# Patient Record
Sex: FEMALE | Race: WHITE | Employment: FULL TIME | URBAN - METROPOLITAN AREA
[De-identification: names, ages, dates, MRNs, and addresses within clinical notes are randomized per-mention and may not be internally consistent; named-entity substitution may affect disease eponyms.]

---

## 2024-11-12 ENCOUNTER — HOSPITAL ENCOUNTER (INPATIENT)
Facility: HOSPITAL | Age: 39
LOS: 1 days | Discharge: HOME | DRG: 378 | End: 2024-11-13
Attending: EMERGENCY MEDICINE | Admitting: FAMILY MEDICINE
Payer: COMMERCIAL

## 2024-11-12 ENCOUNTER — APPOINTMENT (OUTPATIENT)
Facility: HOSPITAL | Age: 39
DRG: 378 | End: 2024-11-12
Payer: COMMERCIAL

## 2024-11-12 DIAGNOSIS — K62.5 RECTAL BLEEDING: Primary | ICD-10-CM

## 2024-11-12 DIAGNOSIS — R18.0 MALIGNANT ASCITES: ICD-10-CM

## 2024-11-12 PROBLEM — K92.2 GIB (GASTROINTESTINAL BLEEDING): Status: ACTIVE | Noted: 2024-11-12

## 2024-11-12 LAB
ABO + RH BLD: NORMAL
ALBUMIN SERPL-MCNC: 3.2 G/DL (ref 3.5–5)
ALBUMIN/GLOB SERPL: 0.7 (ref 1.1–2.2)
ALP SERPL-CCNC: 178 U/L (ref 45–117)
ALT SERPL-CCNC: 24 U/L (ref 12–78)
ANION GAP SERPL CALC-SCNC: 4 MMOL/L (ref 2–12)
APPEARANCE UR: ABNORMAL
AST SERPL-CCNC: 13 U/L (ref 15–37)
BACTERIA URNS QL MICRO: ABNORMAL /HPF
BASOPHILS # BLD: 0 K/UL (ref 0–0.1)
BASOPHILS NFR BLD: 0 % (ref 0–1)
BILIRUB SERPL-MCNC: 0.3 MG/DL (ref 0.2–1)
BILIRUB UR QL: NEGATIVE
BLOOD GROUP ANTIBODIES SERPL: NORMAL
BUN SERPL-MCNC: 14 MG/DL (ref 6–20)
BUN/CREAT SERPL: 14 (ref 12–20)
CALCIUM SERPL-MCNC: 10.1 MG/DL (ref 8.5–10.1)
CHLORIDE SERPL-SCNC: 106 MMOL/L (ref 97–108)
CO2 SERPL-SCNC: 27 MMOL/L (ref 21–32)
COLOR UR: ABNORMAL
COMMENT:: NORMAL
CREAT SERPL-MCNC: 0.98 MG/DL (ref 0.55–1.02)
DIFFERENTIAL METHOD BLD: ABNORMAL
EOSINOPHIL # BLD: 0 K/UL (ref 0–0.4)
EOSINOPHIL NFR BLD: 0 % (ref 0–7)
EPITH CASTS URNS QL MICRO: ABNORMAL /LPF
ERYTHROCYTE [DISTWIDTH] IN BLOOD BY AUTOMATED COUNT: 14.1 % (ref 11.5–14.5)
GLOBULIN SER CALC-MCNC: 4.9 G/DL (ref 2–4)
GLUCOSE SERPL-MCNC: 92 MG/DL (ref 65–100)
GLUCOSE UR STRIP.AUTO-MCNC: NEGATIVE MG/DL
HCT VFR BLD AUTO: 32.7 % (ref 35–47)
HEMOCCULT STL QL: NEGATIVE
HGB BLD-MCNC: 10.8 G/DL (ref 11.5–16)
HGB UR QL STRIP: NEGATIVE
IMM GRANULOCYTES # BLD AUTO: 0 K/UL
IMM GRANULOCYTES NFR BLD AUTO: 0 %
KETONES UR QL STRIP.AUTO: ABNORMAL MG/DL
LEUKOCYTE ESTERASE UR QL STRIP.AUTO: ABNORMAL
LYMPHOCYTES # BLD: 0.8 K/UL (ref 0.8–3.5)
LYMPHOCYTES NFR BLD: 30 % (ref 12–49)
MCH RBC QN AUTO: 30.9 PG (ref 26–34)
MCHC RBC AUTO-ENTMCNC: 33 G/DL (ref 30–36.5)
MCV RBC AUTO: 93.7 FL (ref 80–99)
MONOCYTES # BLD: 0.1 K/UL (ref 0–1)
MONOCYTES NFR BLD: 4 % (ref 5–13)
MYELOCYTES NFR BLD MANUAL: 1 %
NEUTS BAND NFR BLD MANUAL: 4 % (ref 0–6)
NEUTS SEG # BLD: 1.8 K/UL (ref 1.8–8)
NEUTS SEG NFR BLD: 61 % (ref 32–75)
NITRITE UR QL STRIP.AUTO: NEGATIVE
NRBC # BLD: 0 K/UL (ref 0–0.01)
NRBC BLD-RTO: 0 PER 100 WBC
PH UR STRIP: 6 (ref 5–8)
PLATELET # BLD AUTO: 166 K/UL (ref 150–400)
PLATELET COMMENT: ABNORMAL
PMV BLD AUTO: 11.2 FL (ref 8.9–12.9)
POTASSIUM SERPL-SCNC: 3.8 MMOL/L (ref 3.5–5.1)
PROT SERPL-MCNC: 8.1 G/DL (ref 6.4–8.2)
PROT UR STRIP-MCNC: 100 MG/DL
RBC # BLD AUTO: 3.49 M/UL (ref 3.8–5.2)
RBC #/AREA URNS HPF: ABNORMAL /HPF (ref 0–5)
RBC MORPH BLD: ABNORMAL
SODIUM SERPL-SCNC: 137 MMOL/L (ref 136–145)
SP GR UR REFRACTOMETRY: 1.03 (ref 1–1.03)
SPECIMEN EXP DATE BLD: NORMAL
SPECIMEN HOLD: NORMAL
SPECIMEN HOLD: NORMAL
UROBILINOGEN UR QL STRIP.AUTO: 0.2 EU/DL (ref 0.2–1)
WBC # BLD AUTO: 2.8 K/UL (ref 3.6–11)
WBC URNS QL MICRO: ABNORMAL /HPF (ref 0–4)

## 2024-11-12 PROCEDURE — 6360000004 HC RX CONTRAST MEDICATION: Performed by: RADIOLOGY

## 2024-11-12 PROCEDURE — 74177 CT ABD & PELVIS W/CONTRAST: CPT

## 2024-11-12 PROCEDURE — 85025 COMPLETE CBC W/AUTO DIFF WBC: CPT

## 2024-11-12 PROCEDURE — 81001 URINALYSIS AUTO W/SCOPE: CPT

## 2024-11-12 PROCEDURE — 6370000000 HC RX 637 (ALT 250 FOR IP)

## 2024-11-12 PROCEDURE — 6370000000 HC RX 637 (ALT 250 FOR IP): Performed by: FAMILY MEDICINE

## 2024-11-12 PROCEDURE — 80053 COMPREHEN METABOLIC PANEL: CPT

## 2024-11-12 PROCEDURE — 2580000003 HC RX 258: Performed by: FAMILY MEDICINE

## 2024-11-12 PROCEDURE — 86850 RBC ANTIBODY SCREEN: CPT

## 2024-11-12 PROCEDURE — 86900 BLOOD TYPING SEROLOGIC ABO: CPT

## 2024-11-12 PROCEDURE — 1200000000 HC SEMI PRIVATE

## 2024-11-12 PROCEDURE — 99285 EMERGENCY DEPT VISIT HI MDM: CPT

## 2024-11-12 PROCEDURE — 86901 BLOOD TYPING SEROLOGIC RH(D): CPT

## 2024-11-12 PROCEDURE — 82272 OCCULT BLD FECES 1-3 TESTS: CPT

## 2024-11-12 PROCEDURE — 36415 COLL VENOUS BLD VENIPUNCTURE: CPT

## 2024-11-12 RX ORDER — BUSPIRONE HYDROCHLORIDE 10 MG/1
10 TABLET ORAL 2 TIMES DAILY
COMMUNITY

## 2024-11-12 RX ORDER — LIOTHYRONINE SODIUM 25 UG/1
25 TABLET ORAL DAILY
COMMUNITY

## 2024-11-12 RX ORDER — ARIPIPRAZOLE 2 MG/1
2 TABLET ORAL EVERY EVENING
COMMUNITY

## 2024-11-12 RX ORDER — ACETAMINOPHEN 325 MG/1
650 TABLET ORAL EVERY 6 HOURS PRN
Status: DISCONTINUED | OUTPATIENT
Start: 2024-11-12 | End: 2024-11-13 | Stop reason: HOSPADM

## 2024-11-12 RX ORDER — ONDANSETRON 4 MG/1
4 TABLET, ORALLY DISINTEGRATING ORAL EVERY 8 HOURS PRN
Status: DISCONTINUED | OUTPATIENT
Start: 2024-11-12 | End: 2024-11-13 | Stop reason: HOSPADM

## 2024-11-12 RX ORDER — VILAZODONE HYDROCHLORIDE 40 MG/1
20 TABLET ORAL DAILY
COMMUNITY

## 2024-11-12 RX ORDER — MAGNESIUM SULFATE IN WATER 40 MG/ML
2000 INJECTION, SOLUTION INTRAVENOUS PRN
Status: DISCONTINUED | OUTPATIENT
Start: 2024-11-12 | End: 2024-11-13 | Stop reason: HOSPADM

## 2024-11-12 RX ORDER — SODIUM CHLORIDE 9 MG/ML
INJECTION, SOLUTION INTRAVENOUS PRN
Status: DISCONTINUED | OUTPATIENT
Start: 2024-11-12 | End: 2024-11-13 | Stop reason: SDUPTHER

## 2024-11-12 RX ORDER — LAMOTRIGINE 25 MG/1
75 TABLET, CHEWABLE ORAL NIGHTLY
COMMUNITY

## 2024-11-12 RX ORDER — ACETAMINOPHEN 650 MG/1
650 SUPPOSITORY RECTAL EVERY 6 HOURS PRN
Status: DISCONTINUED | OUTPATIENT
Start: 2024-11-12 | End: 2024-11-13 | Stop reason: HOSPADM

## 2024-11-12 RX ORDER — POTASSIUM CHLORIDE 750 MG/1
40 TABLET, EXTENDED RELEASE ORAL PRN
Status: DISCONTINUED | OUTPATIENT
Start: 2024-11-12 | End: 2024-11-13 | Stop reason: HOSPADM

## 2024-11-12 RX ORDER — POTASSIUM CHLORIDE 7.45 MG/ML
10 INJECTION INTRAVENOUS PRN
Status: DISCONTINUED | OUTPATIENT
Start: 2024-11-12 | End: 2024-11-13 | Stop reason: HOSPADM

## 2024-11-12 RX ORDER — ONDANSETRON 2 MG/ML
4 INJECTION INTRAMUSCULAR; INTRAVENOUS EVERY 6 HOURS PRN
Status: DISCONTINUED | OUTPATIENT
Start: 2024-11-12 | End: 2024-11-13 | Stop reason: HOSPADM

## 2024-11-12 RX ORDER — IOPAMIDOL 755 MG/ML
100 INJECTION, SOLUTION INTRAVASCULAR
Status: COMPLETED | OUTPATIENT
Start: 2024-11-12 | End: 2024-11-12

## 2024-11-12 RX ORDER — SODIUM CHLORIDE 0.9 % (FLUSH) 0.9 %
5-40 SYRINGE (ML) INJECTION PRN
Status: DISCONTINUED | OUTPATIENT
Start: 2024-11-12 | End: 2024-11-13 | Stop reason: HOSPADM

## 2024-11-12 RX ORDER — LEVOTHYROXINE SODIUM 88 UG/1
88 TABLET ORAL DAILY
COMMUNITY

## 2024-11-12 RX ORDER — POLYETHYLENE GLYCOL 3350 17 G/17G
17 POWDER, FOR SOLUTION ORAL DAILY PRN
Status: DISCONTINUED | OUTPATIENT
Start: 2024-11-12 | End: 2024-11-13 | Stop reason: HOSPADM

## 2024-11-12 RX ORDER — SODIUM CHLORIDE 0.9 % (FLUSH) 0.9 %
5-40 SYRINGE (ML) INJECTION EVERY 12 HOURS SCHEDULED
Status: DISCONTINUED | OUTPATIENT
Start: 2024-11-12 | End: 2024-11-13 | Stop reason: HOSPADM

## 2024-11-12 RX ADMIN — SODIUM CHLORIDE, PRESERVATIVE FREE 10 ML: 5 INJECTION INTRAVENOUS at 20:45

## 2024-11-12 RX ADMIN — POLYETHYLENE GLYCOL-3350 AND ELECTROLYTES 4000 ML: 236; 6.74; 5.86; 2.97; 22.74 POWDER, FOR SOLUTION ORAL at 18:33

## 2024-11-12 RX ADMIN — ACETAMINOPHEN 650 MG: 325 TABLET ORAL at 17:46

## 2024-11-12 RX ADMIN — IOPAMIDOL 100 ML: 755 INJECTION, SOLUTION INTRAVENOUS at 10:29

## 2024-11-12 ASSESSMENT — ENCOUNTER SYMPTOMS
CHEST TIGHTNESS: 0
COLOR CHANGE: 0
ABDOMINAL PAIN: 0
SINUS PRESSURE: 0
SINUS PAIN: 0
SORE THROAT: 0
BLOOD IN STOOL: 1
NAUSEA: 0
EYES NEGATIVE: 1
TROUBLE SWALLOWING: 0
STRIDOR: 0
WHEEZING: 0
DIARRHEA: 0
SHORTNESS OF BREATH: 0
RHINORRHEA: 0
VOMITING: 0
COUGH: 0
BACK PAIN: 0

## 2024-11-12 ASSESSMENT — PAIN DESCRIPTION - LOCATION
LOCATION: HEAD
LOCATION: HEAD

## 2024-11-12 ASSESSMENT — PAIN DESCRIPTION - ORIENTATION
ORIENTATION: ANTERIOR
ORIENTATION: ANTERIOR

## 2024-11-12 ASSESSMENT — PAIN SCALES - GENERAL
PAINLEVEL_OUTOF10: 2
PAINLEVEL_OUTOF10: 2

## 2024-11-12 ASSESSMENT — PAIN DESCRIPTION - DESCRIPTORS
DESCRIPTORS: DISCOMFORT;DULL
DESCRIPTORS: DISCOMFORT;DULL

## 2024-11-12 ASSESSMENT — PAIN - FUNCTIONAL ASSESSMENT: PAIN_FUNCTIONAL_ASSESSMENT: NONE - DENIES PAIN

## 2024-11-12 NOTE — CONSULTS
Patient consult received.    Thank you for choosing our hematology/oncology group to be a part of your care team.  A formal consult note will follow.     Maru PERLA, NP      [facility] at [address], [address] [place] [address]  W: [phone]  F: [phone]

## 2024-11-12 NOTE — PROGRESS NOTES
1756: PT requesting to take at home medications as follows:  Synthroid- 88mcg 1x monring  Liothyonin- 25mcg 1x morning  Vilazadone- 20mg 1x morning  Buspirone- 10mg BID  Abilify- 2mg 1x Bedtime  Lamotrigine- 75mg 1x Bedtime  Clonazepam- 0.5mg- PRN  Omeprazole- 20mg 1x Nightly

## 2024-11-12 NOTE — ED PROVIDER NOTES
[facility] EMERGENCY DEP  EMERGENCY DEPARTMENT ENCOUNTER      Pt Name: Diane Griggs  MRN: [id number]  Birthdate 1985  Date of evaluation: 11/12/2024  Provider: Deep Holly MD    CHIEF COMPLAINT       Chief Complaint   Patient presents with    Rectal Bleeding         HISTORY OF PRESENT ILLNESS    This is a 39-year-old female who presents with a history of stage III ovarian cancer currently being treated with chemotherapy in [place].  She had her last dose just this past week.  She is traveling as a  helping to establish a new practice here in the [facility].  Last night she had a little bit of obstipation and took some stool softener.  This morning she was attempting to have a bowel movement.  She was not straining but noticed a moderate amount of blood that came out into the toilet and when she wiped there was some clot.  She does not think it was vaginal.  She has had a complete abdominal hysterectomy with her cancer.  She is on no blood thinner.  Patient voices no other acute complaints presently.  There is been no dizziness, shortness of breath or other acute symptomatology.            Review of External Medical Records:     Nursing Notes were reviewed.    REVIEW OF SYSTEMS       Review of Systems   Constitutional:  Negative for activity change, chills, fatigue and fever.   HENT:  Negative for congestion, ear pain, rhinorrhea, sinus pressure, sinus pain, sore throat and trouble swallowing.    Eyes: Negative.    Respiratory:  Negative for cough, chest tightness, shortness of breath, wheezing and stridor.    Cardiovascular:  Negative for chest pain, palpitations and leg swelling.   Gastrointestinal:  Positive for blood in stool. Negative for abdominal pain, diarrhea, nausea and vomiting.   Endocrine: Negative.    Genitourinary:  Negative for decreased urine volume, difficulty urinating, flank pain, frequency and hematuria.   Musculoskeletal:  Negative for back pain, joint swelling and neck  pain.   Skin:  Negative for color change, pallor and rash.   Neurological:  Negative for dizziness, syncope, speech difficulty, weakness, numbness and headaches.   Psychiatric/Behavioral:  Negative for agitation and behavioral problems.              PAST MEDICAL HISTORY   History reviewed. No pertinent past medical history.      SURGICAL HISTORY       Past Surgical History:   Procedure Laterality Date    HYSTERECTOMY, TOTAL ABDOMINAL (CERVIX REMOVED)      MASTECTOMY, BILATERAL           CURRENT MEDICATIONS       Previous Medications    ARIPIPRAZOLE (ABILIFY) 2 MG TABLET    Take 1 tablet by mouth every evening    BUSPIRONE (BUSPAR) 10 MG TABLET    Take 1 tablet by mouth in the morning and at bedtime    LAMOTRIGINE (LAMICTAL) 25 MG CHEW CHEW TAB    Take 3 tablets by mouth nightly    LEVOTHYROXINE (SYNTHROID) 88 MCG TABLET    Take 1 tablet by mouth Daily    LIOTHYRONINE (CYTOMEL) 25 MCG TABLET    Take 1 tablet by mouth daily    LORATADINE PO    Take by mouth    MULTIPLE VITAMINS-MINERALS (BARIATRIC MULTIVITAMINS/IRON PO)    Take by mouth    OMEPRAZOLE (PRILOSEC) 20 MG DELAYED RELEASE CAPSULE    Take 1 capsule by mouth Daily    VILAZODONE HCL 40 MG TABS    Take 0.5 tablets by mouth daily       ALLERGIES     Zoloft [sertraline]    FAMILY HISTORY     History reviewed. No pertinent family history.       SOCIAL HISTORY       Social History     Socioeconomic History    Marital status:      Spouse name: None    Number of children: None    Years of education: None    Highest education level: None   Tobacco Use    Smoking status: Never    Smokeless tobacco: Never     Social Determinants of Health     Financial Resource Strain: Low Risk  (10/31/2024)    Received from [facility]    Overall Financial Resource Strain (CARDIA)     Difficulty of Paying Living Expenses: Not hard at all   Food Insecurity: No Food Insecurity (10/31/2024)    Received from [facility]    Hunger Vital Sign     Worried About

## 2024-11-12 NOTE — ED TRIAGE NOTES
Pt comes to ED from home with c/o constipation due to traveling. She took a stool softener around 1700. This AM she was unable to have a BM, but said there was blood on the toilet.     Pt has had a DOMINIQUE and does have stage III ovarian cancer.

## 2024-11-12 NOTE — H&P
History and Physical    Date of Service:  11/12/2024  Primary Care Provider: No primary care provider on file.  Source of information: The patient and Chart review    Chief Complaint: Rectal Bleeding      History of Presenting Illness:   Diane Griggs is a 39 y.o. female who presents with rectal bleeding.  Patient has been constipated due to traveling.  Taking stool softener today had on the toilet.  Patient has history of DOMINIQUE and has history of ovarian cancer stage III.  Pelvic exam completed by ER attending no sign of vaginal bleeding obvious sign of rectal bleeding patient reported blood clot on the toilet paper.  On arrival CBC and BMP completed, BMP unremarkable, WBC 12.8, hemoglobin 10.8, hematocrit 32.7, CT scan of abdomen shows surgical artifact include bilateral mastectomy breast reconstruction cholecystectomy gastric bypass hysterectomy bilateral inguinal hernia repair moderate to large amount of free peritoneal fluid, mesenteric implant shows liver quadrant measuring 7 mm, substantial concern for potential metastatic disease.  Patient reports was in [place] and had paracentesis with 2300 mL ascites drained.  Has follow-up with them next week and plans to return to [place] for oncology follow-up    The patient denies any headache, blurry vision, sore throat, trouble swallowing, trouble with speech, chest pain, SOB, cough, fever, chills, N/V/D, abd pain, urinary symptoms, constipation, recent travels, sick contacts, focal or generalized neurological symptoms, falls, injuries, rashes, contact with COVID-19 diagnosed patients, hematemesis, melena, hemoptysis, hematuria, rashes, denies starting any new medications and denies any other concerns or problems besides as mentioned above.         REVIEW OF SYSTEMS:  A comprehensive review of systems was negative except for that written in the History of Present Illness.     History reviewed. No pertinent past medical history.   Past Surgical History:    3. See report for other findings.      Electronically signed by Ronnie Reyna MD           ECG/ECHO:  [unfilled]       Notes reviewed from all clinical/nonclinical/nursing services involved in patient's clinical care. Care coordination discussions were held with appropriate clinical/nonclinical/ nursing providers based on care coordination needs.     Assessment:   Given the patient's current clinical presentation, there is a high level of concern for decompensation if discharged from the emergency department. Complex decision making was performed, which includes reviewing the patient's available past medical records, laboratory results, and imaging studies.    Active Problems:    * No active hospital problems. *  Resolved Problems:    * No resolved hospital problems. *      Plan:     Patient 39-year-old female admitted for rectal bleed  1.  Rectal bleeding: CT scan shows; moderate to large amount of ascitis fluid, mesenteric implant.  Consult potential metastatic disease, monitor hemoglobin consult GI, GI plans for colonoscopy tomorrow  2.  Ovarian cancer: Status post hysterectomy, oophorectomy: Follow-up with oncologist in [place], status post paracentesis in [place] recently.  2300 mL fluid removed      DIET: No diet orders on file   ISOLATION PRECAUTIONS: No active isolations  CODE STATUS: No Order   Central Line:     DVT PROPHYLAXIS: SCDs  FUNCTIONAL STATUS PRIOR TO HOSPITALIZATION: Fully active and ambulatory; able to carry on all self-care without restriction.  Ambulatory status/function: By self   EARLY MOBILITY ASSESSMENT: Recommend routine ambulation while hospitalized with the assistance of nursing staff  ANTICIPATED DISCHARGE: 24-48 hours.  ANTICIPATED DISPOSITION: Home  EMERGENCY CONTACT/SURROGATE DECISION MAKER:     CRITICAL CARE WAS PERFORMED FOR THIS ENCOUNTER: NO.      Signed By: Kareem Patrick MD     November 12, 2024         Please note that this dictation may have been completed with Dragon,

## 2024-11-12 NOTE — ED NOTES
ED TO INPATIENT SBAR HANDOFF    Patient Name: Diane Griggs   :  1985  39 y.o.   MRN:  [id number]  ED Room #:  ER18/18     Situation  Code Status: Full Code   Allergies: Zoloft [sertraline]  Weight: Patient Vitals for the past 96 hrs (Last 3 readings):   Weight   24 0655 84.5 kg (186 lb 4.6 oz)       Arrived from: home    Chief Complaint:   Chief Complaint   Patient presents with    Rectal Bleeding       Hospital Problem/Diagnosis:  Principal Problem:    GIB (gastrointestinal bleeding)  Resolved Problems:    * No resolved hospital problems. *      Mobility: no current mobility problem   ED Fall Risk: Presents to emergency department  because of falls (Syncope, seizure, or loss of consciousness): No, Age > 70: No, Altered Mental Status, Intoxication with alcohol or substance confusion (Disorientation, impaired judgment, poor safety awaremess, or inability to follow instructions): No, Impaired Mobility: Ambulates or transfers with assistive devices or assistance; Unable to ambulate or transer.: No, Nursing Judgement: No   Fell in ED or prior to admission: no   Restraints: no     Sitter: no   Family/Caregiver Present: no    Neet to know social/safety information: very pleasant pt, up ad fransisca, a&Ox4    Background  History: History reviewed. No pertinent past medical history.    Assessment    Abnormal Assessment Findings: see ct results  Imaging:   CT ABDOMEN PELVIS W IV CONTRAST Additional Contrast? None   Final Result      1. Surgical artifacts including bilateral mastectomy with breast   reconstructions, cholecystectomy, gastric bypass, hysterectomy, bilateral   inguinal herniorrhaphy.   2. Moderate to large amount of free peritoneal fluid. Stranding within the   omentum and mesentery. Mesenteric implant shown in left lower quadrant measuring   up to 17 mm. Findings are substantial concern for peritoneal metastatic disease.   3. See report for other findings.      Electronically signed by Ronnie Reyna MD  coherent  Orientation Level:    NIH Score:    C-SSRS: Risk of Suicide: No Risk    PO Status: Regular  Bedside swallow:    [place] Coma Scale (GCS): Dwayne Coma Scale  Eye Opening: Spontaneous  Best Verbal Response: Oriented  Best Motor Response: Obeys commands  Dwayne Coma Scale Score: 15    Active LDA's:   Peripheral IV 11/12/24 Right Antecubital (Active)   Site Assessment Clean, dry & intact 11/12/24 0717   Line Status Blood return noted;Specimen collected;Flushed;Normal saline locked 11/12/24 0717   Phlebitis Assessment No symptoms 11/12/24 0717   Infiltration Assessment 0 11/12/24 0717   Alcohol Cap Used Yes 11/12/24 0717   Dressing Status New dressing applied 11/12/24 0717   Dressing Type Transparent 11/12/24 0717   Dressing Intervention New 11/12/24 0717     Pertinent or High Risk Medications/Drips: no   Titratable drips: no   Pending Blood Product Administration: no     Sepsis: Sepsis Risk Score   Predictive Model Details          2 (Low)  Factor Value    Calculated 11/12/2024 16:06 -11% O2 Delivery Method ROOM AIR    [facility] EARLY DETECTION OF SEPSIS VERSION 2 Model 11% Age 40 years old     -9% Total Active Inpatient Meds 13     -7% Albumin 3.2 g/dL     6% AST 13 U/L     5% WBC Count 2.8 K/uL     -4% Has Imaging Procedure present     4% Duration of Encounter .3 days     -4% RBC Count 3.49 M/uL     3% Absolute Monocytes 0.1 K/UL      Recent Labs     11/12/24 0717   WBC 2.8*     Blood Cultures Drawn: No         Recommendation    Pending orders none  Consults ordered: IP CONSULT TO GI  IP CONSULT TO ONCOLOGY    Consulted provider:    Spoke with staff member on 6E and they are aware that sbar note is in.   If any further questions, please call Sending RN at [phone]  Electronically signed by: Electronically signed by KRAIG SIDDIQUI RN on 11/12/2024 at 4:09 PM

## 2024-11-12 NOTE — ED NOTES
Assumed care of patient. Pt resting in position of comfort. Call bell within reach.     Pt presents to ED with reports of constipation due to traveling. Reports last night she took 2 stool softeners. This morning when she woke up she noted the toilet water was red and there appeared to be a blood clot on the toilet paper. Last bm was Friday or Saturday.    Chaperoned Dr Holly during vaginal and rectal exam. Pt tolerated well. No signs of vaginal bleeding. No obvious signs of rectal bleeding

## 2024-11-12 NOTE — CONSULTS
[facility] [address] [address]        GASTROENTEROLOGY CONSULTATION NOTE  Angela Braga PA-C  [phone] office  NP/PA in-hospital M-F until 4:30PM  After 5PM or on weekends, please call  for physician on call        NAME:  Diane Griggs   :   1985   MRN:   [id number]       Referring Physician: Avni    Consult Date: 2024 2:50 PM    Chief Complaint: GIB     History of Present Illness:  Patient is a 39 y.o. who is seen in consultation at the request of Dr. Holly for GIB. Patient has a past medical history significant for ovarian cancer s/p total hysterectomy, gastric sleeve, ascites, BRCA1 positive, s/p bilateral mastectomy, hypothyroidism. She initially presented for rectal bleeding. She is visiting from out of town for work, medical care is typically managed in [place]. She noticed some constipation and took a stool softener, followed by blood that filled the toilet bowel this morning. Reports intermittent blood on the toilet tissue over the years, but nothing this amount. Has some rectal and abdominal tenderness. No nausea or vomiting. No dysphagia. No regular NSAID use. No colonoscopy history.    I have reviewed the emergency room note, hospital admission note, notes by all other clinicians who have seen the patient during this hospitalization to date. I have reviewed the problem list and the reason for this hospitalization. I have reviewed the allergies and the medications the patient was taking at home prior to this hospitalization.    PMH:  History reviewed. No pertinent past medical history.    PSH:  Past Surgical History:   Procedure Laterality Date    HYSTERECTOMY, TOTAL ABDOMINAL (CERVIX REMOVED)      MASTECTOMY, BILATERAL         Allergies:  Allergies   Allergen Reactions    Zoloft [Sertraline] Other (See Comments)     Suicidal thoughts       Home Medications:  Prior to Admission Medications   Prescriptions Last Dose Informant Patient  breast  reconstructions, cholecystectomy, gastric bypass, hysterectomy, bilateral  inguinal herniorrhaphy.  2. Moderate to large amount of free peritoneal fluid. Stranding within the  omentum and mesentery. Mesenteric implant shown in left lower quadrant measuring  up to 17 mm. Findings are substantial concern for peritoneal metastatic disease.  3. See report for other findings.     Assessment:     GI bleed: Hgb 10.8, with recent hematochezia. Endorses rectal/abdominal tenderness. CT 11/12/24 as above, no evidence of active bleeding. Differential includes hemorrhoids vs fissure vs diverticular bleed vs malignancy vs UGI source such as gastritis vs GERD vs PUD. Recommend EGD/colonoscopy to further investigate source of bleeding. Discussed procedure with patient who is in agreement to proceed.   Ovarian cancer: BRCA1 gene positive, s/p total hysterectomy and bilateral mastectomy. Typically followed by oncology in [place]. Oncology consulted.   Hx gastric sleeve     Patient Active Problem List   Diagnosis    GIB (gastrointestinal bleeding)     Plan:       CLD today  NPO at midnight  Bowel prep tonight  BID PPI  Trend CBC  Transfuse as necessary  Plan  for EGD/colonoscopy 11/13/24: discussed procedure with patient who is in agreement to proceed.  If pt begins to bleed briskly, order CTA GI bleed protocol and immediately consult IR for intervention if the CTA is positive  Discussed patient with Dr. Staley   Thank you for allowing me to participate in care of Diane Griggs       Signed By: RADHA Martinez     11/12/2024  2:50 PM

## 2024-11-13 ENCOUNTER — ANESTHESIA (OUTPATIENT)
Facility: HOSPITAL | Age: 39
DRG: 378 | End: 2024-11-13
Payer: COMMERCIAL

## 2024-11-13 ENCOUNTER — ANESTHESIA EVENT (OUTPATIENT)
Facility: HOSPITAL | Age: 39
DRG: 378 | End: 2024-11-13
Payer: COMMERCIAL

## 2024-11-13 VITALS
OXYGEN SATURATION: 98 % | HEART RATE: 89 BPM | WEIGHT: 186 LBS | DIASTOLIC BLOOD PRESSURE: 90 MMHG | HEIGHT: 65 IN | RESPIRATION RATE: 14 BRPM | SYSTOLIC BLOOD PRESSURE: 129 MMHG | TEMPERATURE: 98.1 F | BODY MASS INDEX: 30.99 KG/M2

## 2024-11-13 PROBLEM — K92.2 GIB (GASTROINTESTINAL BLEEDING): Status: RESOLVED | Noted: 2024-11-12 | Resolved: 2024-11-13

## 2024-11-13 LAB
ALBUMIN SERPL-MCNC: 2.6 G/DL (ref 3.5–5)
ALBUMIN/GLOB SERPL: 0.7 (ref 1.1–2.2)
ALP SERPL-CCNC: 135 U/L (ref 45–117)
ALT SERPL-CCNC: 20 U/L (ref 12–78)
ANION GAP SERPL CALC-SCNC: 4 MMOL/L (ref 2–12)
AST SERPL-CCNC: 13 U/L (ref 15–37)
BASOPHILS # BLD: 0 K/UL (ref 0–0.1)
BASOPHILS NFR BLD: 1 % (ref 0–1)
BILIRUB SERPL-MCNC: 0.2 MG/DL (ref 0.2–1)
BUN SERPL-MCNC: 10 MG/DL (ref 6–20)
BUN/CREAT SERPL: 13 (ref 12–20)
CALCIUM SERPL-MCNC: 9.5 MG/DL (ref 8.5–10.1)
CHLORIDE SERPL-SCNC: 110 MMOL/L (ref 97–108)
CO2 SERPL-SCNC: 26 MMOL/L (ref 21–32)
CREAT SERPL-MCNC: 0.79 MG/DL (ref 0.55–1.02)
DIFFERENTIAL METHOD BLD: ABNORMAL
EOSINOPHIL # BLD: 0.1 K/UL (ref 0–0.4)
EOSINOPHIL NFR BLD: 2 % (ref 0–7)
ERYTHROCYTE [DISTWIDTH] IN BLOOD BY AUTOMATED COUNT: 14.7 % (ref 11.5–14.5)
GLOBULIN SER CALC-MCNC: 3.9 G/DL (ref 2–4)
GLUCOSE SERPL-MCNC: 75 MG/DL (ref 65–100)
HCT VFR BLD AUTO: 30.9 % (ref 35–47)
HCT VFR BLD AUTO: 34.2 % (ref 35–47)
HGB BLD-MCNC: 10.1 G/DL (ref 11.5–16)
HGB BLD-MCNC: 11.1 G/DL (ref 11.5–16)
IMM GRANULOCYTES # BLD AUTO: 0 K/UL (ref 0–0.04)
IMM GRANULOCYTES NFR BLD AUTO: 1 % (ref 0–0.5)
LYMPHOCYTES # BLD: 0.9 K/UL (ref 0.8–3.5)
LYMPHOCYTES NFR BLD: 33 % (ref 12–49)
MCH RBC QN AUTO: 30.5 PG (ref 26–34)
MCHC RBC AUTO-ENTMCNC: 32.7 G/DL (ref 30–36.5)
MCV RBC AUTO: 93.4 FL (ref 80–99)
MONOCYTES # BLD: 0.3 K/UL (ref 0–1)
MONOCYTES NFR BLD: 11 % (ref 5–13)
NEUTS SEG # BLD: 1.4 K/UL (ref 1.8–8)
NEUTS SEG NFR BLD: 52 % (ref 32–75)
NRBC # BLD: 0 K/UL (ref 0–0.01)
NRBC BLD-RTO: 0 PER 100 WBC
PLATELET # BLD AUTO: 130 K/UL (ref 150–400)
PMV BLD AUTO: 11.1 FL (ref 8.9–12.9)
POTASSIUM SERPL-SCNC: 3.8 MMOL/L (ref 3.5–5.1)
PROT SERPL-MCNC: 6.5 G/DL (ref 6.4–8.2)
RBC # BLD AUTO: 3.31 M/UL (ref 3.8–5.2)
SODIUM SERPL-SCNC: 140 MMOL/L (ref 136–145)
WBC # BLD AUTO: 2.6 K/UL (ref 3.6–11)

## 2024-11-13 PROCEDURE — 3700000000 HC ANESTHESIA ATTENDED CARE: Performed by: INTERNAL MEDICINE

## 2024-11-13 PROCEDURE — 6360000002 HC RX W HCPCS: Performed by: NURSE ANESTHETIST, CERTIFIED REGISTERED

## 2024-11-13 PROCEDURE — 85018 HEMOGLOBIN: CPT

## 2024-11-13 PROCEDURE — 85014 HEMATOCRIT: CPT

## 2024-11-13 PROCEDURE — 0DJD8ZZ INSPECTION OF LOWER INTESTINAL TRACT, VIA NATURAL OR ARTIFICIAL OPENING ENDOSCOPIC: ICD-10-PCS | Performed by: INTERNAL MEDICINE

## 2024-11-13 PROCEDURE — 2709999900 HC NON-CHARGEABLE SUPPLY: Performed by: INTERNAL MEDICINE

## 2024-11-13 PROCEDURE — 0DJ08ZZ INSPECTION OF UPPER INTESTINAL TRACT, VIA NATURAL OR ARTIFICIAL OPENING ENDOSCOPIC: ICD-10-PCS | Performed by: INTERNAL MEDICINE

## 2024-11-13 PROCEDURE — 36415 COLL VENOUS BLD VENIPUNCTURE: CPT

## 2024-11-13 PROCEDURE — 7100000010 HC PHASE II RECOVERY - FIRST 15 MIN: Performed by: INTERNAL MEDICINE

## 2024-11-13 PROCEDURE — 80053 COMPREHEN METABOLIC PANEL: CPT

## 2024-11-13 PROCEDURE — 85025 COMPLETE CBC W/AUTO DIFF WBC: CPT

## 2024-11-13 PROCEDURE — 3600007502: Performed by: INTERNAL MEDICINE

## 2024-11-13 PROCEDURE — 3700000001 HC ADD 15 MINUTES (ANESTHESIA): Performed by: INTERNAL MEDICINE

## 2024-11-13 PROCEDURE — 2580000003 HC RX 258: Performed by: FAMILY MEDICINE

## 2024-11-13 PROCEDURE — 2500000003 HC RX 250 WO HCPCS: Performed by: NURSE ANESTHETIST, CERTIFIED REGISTERED

## 2024-11-13 PROCEDURE — 2720000010 HC SURG SUPPLY STERILE: Performed by: INTERNAL MEDICINE

## 2024-11-13 PROCEDURE — 7100000011 HC PHASE II RECOVERY - ADDTL 15 MIN: Performed by: INTERNAL MEDICINE

## 2024-11-13 PROCEDURE — 3600007512: Performed by: INTERNAL MEDICINE

## 2024-11-13 RX ORDER — PHENYLEPHRINE HCL IN 0.9% NACL 0.4MG/10ML
SYRINGE (ML) INTRAVENOUS
Status: DISCONTINUED | OUTPATIENT
Start: 2024-11-13 | End: 2024-11-13 | Stop reason: SDUPTHER

## 2024-11-13 RX ORDER — SODIUM CHLORIDE 9 MG/ML
INJECTION, SOLUTION INTRAVENOUS CONTINUOUS
Status: DISCONTINUED | OUTPATIENT
Start: 2024-11-13 | End: 2024-11-13

## 2024-11-13 RX ORDER — HYDROCORTISONE ACETATE 25 MG/1
25 SUPPOSITORY RECTAL
Qty: 10 SUPPOSITORY | Refills: 0 | Status: SHIPPED | OUTPATIENT
Start: 2024-11-13

## 2024-11-13 RX ORDER — SODIUM CHLORIDE 0.9 % (FLUSH) 0.9 %
5-40 SYRINGE (ML) INJECTION EVERY 12 HOURS SCHEDULED
Status: DISCONTINUED | OUTPATIENT
Start: 2024-11-13 | End: 2024-11-13 | Stop reason: HOSPADM

## 2024-11-13 RX ORDER — LIDOCAINE HYDROCHLORIDE 20 MG/ML
INJECTION, SOLUTION EPIDURAL; INFILTRATION; INTRACAUDAL; PERINEURAL
Status: DISCONTINUED | OUTPATIENT
Start: 2024-11-13 | End: 2024-11-13 | Stop reason: SDUPTHER

## 2024-11-13 RX ORDER — SODIUM CHLORIDE 0.9 % (FLUSH) 0.9 %
5-40 SYRINGE (ML) INJECTION PRN
Status: DISCONTINUED | OUTPATIENT
Start: 2024-11-13 | End: 2024-11-13 | Stop reason: HOSPADM

## 2024-11-13 RX ORDER — SODIUM CHLORIDE 9 MG/ML
INJECTION, SOLUTION INTRAVENOUS PRN
Status: DISCONTINUED | OUTPATIENT
Start: 2024-11-13 | End: 2024-11-13 | Stop reason: HOSPADM

## 2024-11-13 RX ADMIN — PROPOFOL 40 MG: 10 INJECTION, EMULSION INTRAVENOUS at 15:33

## 2024-11-13 RX ADMIN — PROPOFOL 50 MG: 10 INJECTION, EMULSION INTRAVENOUS at 15:28

## 2024-11-13 RX ADMIN — PROPOFOL 100 MG: 10 INJECTION, EMULSION INTRAVENOUS at 15:38

## 2024-11-13 RX ADMIN — SODIUM CHLORIDE, PRESERVATIVE FREE 10 ML: 5 INJECTION INTRAVENOUS at 09:20

## 2024-11-13 RX ADMIN — Medication 120 MCG: at 15:31

## 2024-11-13 RX ADMIN — PROPOFOL 150 MG: 10 INJECTION, EMULSION INTRAVENOUS at 15:25

## 2024-11-13 RX ADMIN — LIDOCAINE HYDROCHLORIDE 50 MG: 20 INJECTION, SOLUTION EPIDURAL; INFILTRATION; INTRACAUDAL; PERINEURAL at 15:25

## 2024-11-13 ASSESSMENT — PAIN - FUNCTIONAL ASSESSMENT: PAIN_FUNCTIONAL_ASSESSMENT: NONE - DENIES PAIN

## 2024-11-13 NOTE — PROGRESS NOTES
Hospitalist Progress Note  Rc Pope MD  Answering service: [phone] OR [phone] from in house phone        Date of Service:  2024  NAME:  Diane Griggs  :  1985  MRN:  [id number]      Admission Summary:   Diane Griggs is a 39 y.o. female who presents with rectal bleeding.  Patient has been constipated due to traveling.  Taking stool softener today had on the toilet.  Patient has history of DOMINIQUE and has history of ovarian cancer stage III.  Pelvic exam completed by ER attending no sign of vaginal bleeding obvious sign of rectal bleeding patient reported blood clot on the toilet paper.  On arrival CBC and BMP completed, BMP unremarkable, WBC 12.8, hemoglobin 10.8, hematocrit 32.7, CT scan of abdomen shows surgical artifact include bilateral mastectomy breast reconstruction cholecystectomy gastric bypass hysterectomy bilateral inguinal hernia repair moderate to large amount of free peritoneal fluid, mesenteric implant shows liver quadrant measuring 7 mm, substantial concern for potential metastatic disease.  Patient reports was in [place] and had paracentesis with 2300 mL ascites drained.  Has follow-up with them next week and plans to return to [place] for oncology follow-up     Interval history / Subjective:   Seen and examined for follow up rectal bleeding. No further bleeding when I saw her.    Later in the day had EGD/colonoscopy.     Assessment & Plan:     Rectal bleeding  -colonoscopy shows internal hemorrhoids  -Hb 10.1 today  -EGD negative  -Will recheck Hb and DC if stable    Ovarian cancer: Status post hysterectomy, oophorectomy:   -Follow-up with oncologist in [place], status post paracentesis in [place] recently.  2300 mL fluid removed      Code status:   Prophylaxis:   Care Plan discussed with:   Anticipated Disposition:      Principal Problem:    GIB (gastrointestinal bleeding)  Resolved

## 2024-11-13 NOTE — DISCHARGE SUMMARY
Discharge Summary       PATIENT ID: Diane Griggs  MRN: [id number]   YOB: 1985    DATE OF ADMISSION: 11/12/2024  8:06 AM    DATE OF DISCHARGE: 1/13/24   PRIMARY CARE PROVIDER: No primary care provider on file.     ATTENDING PHYSICIAN: Rc Pope MD  DISCHARGING PROVIDER: Rc Pope MD    To contact this individual call [phone] and ask the  to page.  If unavailable ask to be transferred the Adult Hospitalist Department.    CONSULTATIONS: IP CONSULT TO GI  IP CONSULT TO ONCOLOGY    PROCEDURES/SURGERIES: Procedure(s):  ESOPHAGOGASTRODUODENOSCOPY  COLONOSCOPY DIAGNOSTIC    ADMITTING DIAGNOSES & HOSPITAL COURSE:   Diane Griggs is a 39 y.o. female who presents with rectal bleeding.  Patient has been constipated due to traveling.  Taking stool softener today had on the toilet.  Patient has history of DOMINIQUE and has history of ovarian cancer stage III.  Pelvic exam completed by ER attending no sign of vaginal bleeding obvious sign of rectal bleeding patient reported blood clot on the toilet paper.  On arrival CBC and BMP completed, BMP unremarkable, WBC 12.8, hemoglobin 10.8, hematocrit 32.7, CT scan of abdomen shows surgical artifact include bilateral mastectomy breast reconstruction cholecystectomy gastric bypass hysterectomy bilateral inguinal hernia repair moderate to large amount of free peritoneal fluid, mesenteric implant shows liver quadrant measuring 7 mm, substantial concern for potential metastatic disease.  Patient reports was in [place] and had paracentesis with 2300 mL ascites drained.  Has follow-up with them next week and plans to return to [place] for oncology follow-up     Patient 39-year-old female admitted for rectal bleed  1.  Rectal bleeding: CT scan shows; moderate to large amount of Bretylol fluid, mesenteric implant.  Consult potential metastatic disease, monitor hemoglobin consult GI, GI plans for colonoscopy tomorrow  2.  Ovarian cancer: Status post

## 2024-11-13 NOTE — PERIOP NOTE
TRANSFER - IN REPORT:    Verbal report received from KAZ Tello on Diane Griggs  being received from 620 for ordered procedure      Information from the following report(s) Adult Overview, Recent Results, Pre Procedure Checklist, Procedure Verification, and Quality Measures was reviewed with the receiving nurse.    Opportunity for questions and clarification was provided.      Assessment completed upon patient's arrival to unit and care assumed.

## 2024-11-13 NOTE — PROGRESS NOTES
Initial RN admission and assessment performed and documented in Endoscopy navigator.     Patient evaluated by anesthesia in pre-procedure holding.     All procedural vital signs, airway assessment, and level of consciousness information monitored and recorded by anesthesia staff on the anesthesia record.     Report received from CRNA post procedure.  Patient transported to recovery area by RN.    Endoscopy post procedure time out was performed. Endoscope was pre-cleaned at bedside immediately following procedure by CHAPIN Butts.

## 2024-11-13 NOTE — OP NOTE
[facility]  [address], [place] [address]        Colonoscopy Operative Report    Diane Griggs  [id number]  1985      Procedure Type:   Colonoscopy --diagnostic     Indications:    Lower rectal bleeding         Pre-operative Diagnosis: see indication above    Post-operative Diagnosis:  See findings below    :  Patricio Staley MD    Staff: Circulator: Susana Prabhakar RN     Referring Provider: No primary care provider on file.      Sedation:  MAC      Procedure Details:  After informed consent was obtained with all risks and benefits of procedure explained and preoperative exam completed, the patient was taken to the endoscopy suite and placed in the left lateral decubitus position.  Upon sequential sedation as per above, a digital rectal exam was performed demonstrating internal hemorrhoids.  The Olympus pediatric videocolonoscope  was inserted in the rectum and carefully advanced to the terminal ileum.  The cecum was identified by the ileocecal valve and appendiceal orifice.  The quality of preparation was good.  The colonoscope was slowly withdrawn with careful evaluation between folds. Retroflexion in the rectum was completed .     Findings:   Normal appearing colon and terminal ileum.      Specimen Removed:  none    Complications: None.     EBL:  None.    Impression:     As above    Recommendations:  Monitor CBC  Anusol 25 mg suppository at bedtime for 10 nights  High fiber diet education  Repeat colonoscopy in 10 years  Advance diet as tolerated  Inpatient GI service to sign off    Signed By: Patricio Staley MD     11/13/2024  3:44 PM

## 2024-11-13 NOTE — ANESTHESIA PRE PROCEDURE
Department of Anesthesiology  Preprocedure Note       Name:  Diane Griggs   Age:  39 y.o.  :  1985                                          MRN:  [id number]         Date:  2024      Surgeon: Surgeon(s):  Patricio Staley MD    Procedure: Procedure(s):  ESOPHAGOGASTRODUODENOSCOPY  COLONOSCOPY DIAGNOSTIC    Medications prior to admission:   Prior to Admission medications    Medication Sig Start Date End Date Taking? Authorizing Provider   vilazodone hcl 40 MG TABS Take 0.5 tablets by mouth daily   Yes John Parham MD   busPIRone (BUSPAR) 10 MG tablet Take 1 tablet by mouth in the morning and at bedtime   Yes John Parham MD   levothyroxine (SYNTHROID) 88 MCG tablet Take 1 tablet by mouth Daily   Yes John Parham MD   liothyronine (CYTOMEL) 25 MCG tablet Take 1 tablet by mouth daily   Yes John Parham MD   lamoTRIgine (LAMICTAL) 25 MG CHEW chew tab Take 3 tablets by mouth nightly   Yes John Parham MD   ARIPiprazole (ABILIFY) 2 MG tablet Take 1 tablet by mouth every evening   Yes John Parham MD   omeprazole (PRILOSEC) 20 MG delayed release capsule Take 1 capsule by mouth Daily   Yes John Parham MD   Multiple Vitamins-Minerals (BARIATRIC MULTIVITAMINS/IRON PO) Take by mouth   Yes John Parham MD   LORATADINE PO Take by mouth   Yes John Parham MD       Current medications:    Current Facility-Administered Medications   Medication Dose Route Frequency Provider Last Rate Last Admin   • 0.9 % sodium chloride infusion   IntraVENous Continuous Patricio Staley MD       • sodium chloride flush 0.9 % injection 5-40 mL  5-40 mL IntraVENous 2 times per day Patricio Staley MD       • sodium chloride flush 0.9 % injection 5-40 mL  5-40 mL IntraVENous PRN Patricio Staley MD       • 0.9 % sodium chloride infusion   IntraVENous PRN Patricio Staley MD       • sodium chloride flush 0.9 % injection 5-40 mL  5-40 mL IntraVENous 2

## 2024-11-13 NOTE — ANESTHESIA POSTPROCEDURE EVALUATION
Department of Anesthesiology  Postprocedure Note    Patient: Diane Griggs  MRN: [id number]  YOB: 1985  Date of evaluation: 11/13/2024    Procedure Summary       Date: 11/13/24 Room / [facility]    Anesthesia Start: 1521 Anesthesia Stop: 1541    Procedures:       ESOPHAGOGASTRODUODENOSCOPY (Upper GI Region)      COLONOSCOPY DIAGNOSTIC (Lower GI Region) Diagnosis:       Anemia, unspecified type      (Anemia, unspecified type [D64.9])    Surgeons: Patricio Staley MD Responsible Provider: Fermin Martin MD    Anesthesia Type: MAC ASA Status: 3            Anesthesia Type: MAC    Kenzie Phase I: Kenzie Score: 10    Kenzie Phase II: Kenzie Score: 9    Anesthesia Post Evaluation    Patient location during evaluation: PACU  Patient participation: complete - patient participated  Level of consciousness: sleepy but conscious and responsive to verbal stimuli  Airway patency: patent  Nausea & Vomiting: no vomiting and no nausea  Cardiovascular status: blood pressure returned to baseline and hemodynamically stable  Respiratory status: acceptable  Hydration status: stable    No notable events documented.

## 2024-11-13 NOTE — OP NOTE
[facility]  [address], [place] [address]          Esophago- Gastroduodenoscopy (EGD) Procedure Note    Diane Griggs  1985  [id number]      Procedure: Endoscopic Gastroduodenoscopy --diagnostic    Indication: rectal bleeding, history of sleeve gastrectomy    Pre-operative Diagnosis: see indication above    Post-operative Diagnosis: see findings below    : Patricio Staley MD    Staff: Circulator: Susana Prabhakar RN     Referring Provider:  No primary care provider on file.      Anesthesia/Sedation: MAC        Procedure Details     After informed consent was obtained for the procedure, with all risks and benefits of procedure explained the patient was taken to the endoscopy suite and placed in the left lateral decubitus position.  Following sequential administration of sedation as per above, the endoscope was inserted into the mouth and advanced under direct vision to second portion of the duodenum. A careful inspection was made as the gastroscope was withdrawn, including a retroflexed view of the proximal stomach; findings and interventions are described below.      Findings:   Esophagus: normal  Stomach: normal appearing gastric sleeve anatomy. Patent lumen at angularis. Normal retroflexed views of proximal and distal sleeve.  Duodenum: normal to second portion    Specimens: none         EBL: None      Complications:   None; patient tolerated the procedure well.           Impression:    As above    Recommendations:  Proceed to colonoscopy    Signed By: Patricio Staley MD     11/13/2024  3:42 PM

## 2024-11-13 NOTE — PERIOP NOTE
TRANSFER - OUT REPORT:    Verbal report given to KAZ Tello  on Diane Griggs  being transferred to  for routine progression of patient care       Information from the following report(s) Surgery Report and Recent Results was reviewed with the receiving nurse.           Lines:   Implantable Port Right Subclavian (Active)   Port-a-Cath Status Not Accessed 11/13/24 0949   Criteria for Appropriate Use Other (comment) 11/12/24 2000   Site Assessment Other (Comment) 11/12/24 2000       Peripheral IV 11/12/24 Right Antecubital (Active)   Site Assessment Clean, dry & intact 11/13/24 0949   Line Status Normal saline locked;Capped;Flushed 11/13/24 0949   Line Care Connections checked and tightened;Cap changed 11/13/24 0949   Phlebitis Assessment No symptoms 11/13/24 0949   Infiltration Assessment 0 11/13/24 0949   Alcohol Cap Used Yes 11/13/24 0949   Dressing Status Intact w/seal 11/13/24 0949   Dressing Type Transparent 11/13/24 0949   Dressing Intervention New 11/12/24 2000        Opportunity for questions and clarification was provided.

## 2024-11-13 NOTE — FLOWSHEET NOTE
11/13/24 1758   AVS Reviewed   AVS & discharge instructions reviewed with patient and/or representative? Yes   Reviewed instructions with Patient   Level of Understanding Verbalized understanding

## 2024-11-21 NOTE — PROGRESS NOTES
Physician Progress Note      PATIENT:               LUIS WARD  CSN #:                  [id number]  :                       1985  ADMIT DATE:       2024 8:06 AM  DISCH DATE:        2024 6:09 PM  RESPONDING  PROVIDER #:        Rc Pope MD          QUERY TEXT:    Patient admitted with GI bleeding, noted to have internal hemorrhoids in   colonoscopy and in DS By IM noted Ovarian cancer.  If possible, please   document in progress notes and discharge summary the cause of the GI bleeding:    The medical record reflects the following:  Risk Factors: rectal bleeding, Ovarian cancer.  Clinical Indicators: OP notes - Procedure Type:  Colonoscopy  DS -colonoscopy shows internal hemorrhoids.  CT scan shows; moderate to large amount of Bretylol fluid, mesenteric implant.    Consult potential metastatic disease, monitor hemoglobin consult GI  Ovarian cancer: Status post hysterectomy, oophorectomy: Follow-up with   oncologist in [place], status post paracentesis in [place] recently.  2300 mL   fluid removed.  Treatment: IVfs, Follow-up with oncologist, consult GI, colonoscopy, monitor   hemoglobin.    Thanks,  Leticia Calhoun S-KARTIK.  Options provided:  -- GI bleeding due to hemorrhoids  -- GI bleeding due to Ovarian cancer  -- Other - I will add my own diagnosis  -- Disagree - Not applicable / Not valid  -- Disagree - Clinically unable to determine / Unknown  -- Refer to Clinical Documentation Reviewer    PROVIDER RESPONSE TEXT:    This patient has GI bleeding due to hemorrhoids.    Query created by: Leticia Calhoun on 2024 7:00 AM      Electronically signed by:  Rc Pope MD 2024 6:44 AM

## (undated) DEVICE — ORISE PROKNIFE 1.5 MM ELECTRODE: Brand: ORISE™ PROKNIFE

## (undated) DEVICE — ORISE PROKNIFE 3.0 MM ELECTRODE: Brand: ORISE™ PROKNIFE

## (undated) DEVICE — SUPPLEMENT DIGESTIVE H2O SOL GI-EASE